# Patient Record
Sex: MALE | Race: OTHER | Employment: FULL TIME | URBAN - METROPOLITAN AREA
[De-identification: names, ages, dates, MRNs, and addresses within clinical notes are randomized per-mention and may not be internally consistent; named-entity substitution may affect disease eponyms.]

---

## 2017-04-20 ENCOUNTER — OFFICE VISIT (OUTPATIENT)
Dept: FAMILY MEDICINE CLINIC | Facility: CLINIC | Age: 27
End: 2017-04-20

## 2017-04-20 ENCOUNTER — LAB ENCOUNTER (OUTPATIENT)
Dept: LAB | Age: 27
End: 2017-04-20
Attending: EMERGENCY MEDICINE
Payer: COMMERCIAL

## 2017-04-20 VITALS
HEIGHT: 67.5 IN | RESPIRATION RATE: 18 BRPM | SYSTOLIC BLOOD PRESSURE: 126 MMHG | DIASTOLIC BLOOD PRESSURE: 84 MMHG | BODY MASS INDEX: 48.86 KG/M2 | HEART RATE: 95 BPM | TEMPERATURE: 98 F | OXYGEN SATURATION: 96 % | WEIGHT: 315 LBS

## 2017-04-20 DIAGNOSIS — Z13.228 SCREENING FOR ENDOCRINE, NUTRITIONAL, METABOLIC AND IMMUNITY DISORDER: ICD-10-CM

## 2017-04-20 DIAGNOSIS — R35.89 POLYURIA: ICD-10-CM

## 2017-04-20 DIAGNOSIS — Z13.29 SCREENING FOR ENDOCRINE, NUTRITIONAL, METABOLIC AND IMMUNITY DISORDER: ICD-10-CM

## 2017-04-20 DIAGNOSIS — E66.01 MORBID OBESITY WITH BMI OF 40.0-44.9, ADULT (HCC): ICD-10-CM

## 2017-04-20 DIAGNOSIS — Z13.0 SCREENING FOR ENDOCRINE, NUTRITIONAL, METABOLIC AND IMMUNITY DISORDER: ICD-10-CM

## 2017-04-20 DIAGNOSIS — Z13.21 SCREENING FOR ENDOCRINE, NUTRITIONAL, METABOLIC AND IMMUNITY DISORDER: ICD-10-CM

## 2017-04-20 DIAGNOSIS — N30.01 ACUTE CYSTITIS WITH HEMATURIA: Primary | ICD-10-CM

## 2017-04-20 PROCEDURE — 99204 OFFICE O/P NEW MOD 45 MIN: CPT | Performed by: EMERGENCY MEDICINE

## 2017-04-20 PROCEDURE — 81003 URINALYSIS AUTO W/O SCOPE: CPT | Performed by: EMERGENCY MEDICINE

## 2017-04-20 PROCEDURE — 83036 HEMOGLOBIN GLYCOSYLATED A1C: CPT | Performed by: EMERGENCY MEDICINE

## 2017-04-20 PROCEDURE — 36415 COLL VENOUS BLD VENIPUNCTURE: CPT | Performed by: EMERGENCY MEDICINE

## 2017-04-20 PROCEDURE — 82306 VITAMIN D 25 HYDROXY: CPT | Performed by: EMERGENCY MEDICINE

## 2017-04-20 PROCEDURE — 80050 GENERAL HEALTH PANEL: CPT | Performed by: EMERGENCY MEDICINE

## 2017-04-20 PROCEDURE — 80061 LIPID PANEL: CPT | Performed by: EMERGENCY MEDICINE

## 2017-04-20 RX ORDER — CIPROFLOXACIN 500 MG/1
500 TABLET, FILM COATED ORAL 2 TIMES DAILY
Qty: 14 TABLET | Refills: 0 | Status: SHIPPED | OUTPATIENT
Start: 2017-04-20 | End: 2017-04-27

## 2017-04-20 RX ORDER — FLUOXETINE HYDROCHLORIDE 40 MG/1
1 CAPSULE ORAL NIGHTLY
Refills: 1 | COMMUNITY
Start: 2017-04-03

## 2017-04-20 RX ORDER — BUPROPION HYDROCHLORIDE 100 MG/1
1 TABLET, EXTENDED RELEASE ORAL DAILY
Refills: 1 | COMMUNITY
Start: 2017-04-03 | End: 2017-06-05 | Stop reason: ALTCHOICE

## 2017-04-20 NOTE — PROGRESS NOTES
URINARY SX  Frequency and urgency of urination for the 5-6 days. + dysuria that is getting better  Noticed blood in the area  No fever no chills  No penile discharge.   No new partners  No back pain  + polyuria and polydypsia    WEIGHT GAIN  heaviest doyle areas  HEENT: atraumatic, normocephalic, ears, nose and throat are clear  EYES: sclera non icteric bilateral  NECK: supple, no adenopathy, no thyromegaly  LUNGS: clear to auscultation, no RRW  CARDIO: RRR without murmur  EXTREMITIES: no cyanosis, clubbing discussion of blood tests

## 2017-04-20 NOTE — PATIENT INSTRUCTIONS
Thank you for choosing [facility]  To Do:  FOR DIANE ELLER  1. Follow up in 1 week for recheck and discussion of labs  2. Follow up with weight loss clinic  3. Start losing weight by taking extra calories and sugras away.  Overall less caloric with you:  · Take a resistance-training or aerobics class  · Join a team sport  · Take a dance class  · Walk the dog  · Ride a bike  If you have health problems, be sure to ask your healthcare provider before you start an exercise program. Have a fitness p favorites. · If you find yourself eating when you’re not hungry, ask yourself why. Many of us eat when we’re bored, stressed, or just to be polite. Listen to your body. If you’re not hungry, get busy doing something else instead of eating.   · Eat slower, changes. When you can stick to this plan, keep making a few more small changes. Taking small steps will help you stay on the path to success. Track your progress  Write down your goals. Then, keep a daily record of your progress.  Write down what you eat a

## 2017-04-24 ENCOUNTER — TELEPHONE (OUTPATIENT)
Dept: FAMILY MEDICINE CLINIC | Facility: CLINIC | Age: 27
End: 2017-04-24

## 2017-04-24 NOTE — TELEPHONE ENCOUNTER
----- Message from Trish Saunders MD sent at 4/21/2017 12:46 PM CDT -----  Low vitamin D, needs vitamin D 50,000 units q weekly #4 with 2 refills then, OTC VIT D 5348-9021 units q daily maintenance   Repeat Vitamin D in 6 months.      Will discuss labs o

## 2017-04-27 ENCOUNTER — OFFICE VISIT (OUTPATIENT)
Dept: FAMILY MEDICINE CLINIC | Facility: CLINIC | Age: 27
End: 2017-04-27

## 2017-04-27 VITALS
HEIGHT: 67.5 IN | OXYGEN SATURATION: 98 % | TEMPERATURE: 99 F | WEIGHT: 315 LBS | RESPIRATION RATE: 20 BRPM | HEART RATE: 100 BPM | BODY MASS INDEX: 48.86 KG/M2 | SYSTOLIC BLOOD PRESSURE: 132 MMHG | DIASTOLIC BLOOD PRESSURE: 90 MMHG

## 2017-04-27 DIAGNOSIS — E66.01 MORBID OBESITY WITH BMI OF 40.0-44.9, ADULT (HCC): ICD-10-CM

## 2017-04-27 DIAGNOSIS — R73.03 PREDIABETES: Primary | ICD-10-CM

## 2017-04-27 DIAGNOSIS — E55.9 VITAMIN D DEFICIENCY: ICD-10-CM

## 2017-04-27 DIAGNOSIS — E78.00 HYPERCHOLESTEROLEMIA: ICD-10-CM

## 2017-04-27 DIAGNOSIS — J45.20 MILD INTERMITTENT ASTHMA WITHOUT COMPLICATION: ICD-10-CM

## 2017-04-27 DIAGNOSIS — N30.00 ACUTE CYSTITIS WITHOUT HEMATURIA: ICD-10-CM

## 2017-04-27 DIAGNOSIS — F39 MOOD DISORDER (HCC): ICD-10-CM

## 2017-04-27 PROCEDURE — 99214 OFFICE O/P EST MOD 30 MIN: CPT | Performed by: EMERGENCY MEDICINE

## 2017-04-27 RX ORDER — ERGOCALCIFEROL 1.25 MG/1
50000 CAPSULE ORAL WEEKLY
Qty: 12 CAPSULE | Refills: 0 | Status: SHIPPED | OUTPATIENT
Start: 2017-04-27 | End: 2017-05-27

## 2017-04-27 RX ORDER — ALBUTEROL SULFATE 90 UG/1
2 AEROSOL, METERED RESPIRATORY (INHALATION) EVERY 6 HOURS PRN
Qty: 1 INHALER | Refills: 1 | Status: SHIPPED | OUTPATIENT
Start: 2017-04-27

## 2017-04-27 NOTE — PROGRESS NOTES
Chief Complaint:   Patient presents with: Follow - Up: f/u labs    HPI:   This is a 32year old male     FOLLOW UP UTI  On Ciprofloxacin. Reports resolution of Symptoms is much improved. Denies any urinary symptoms no fever no flank pain.   Is on the last Take 1 capsule by mouth nightly. Disp:  Rfl: 1   Ciprofloxacin HCl 500 MG Oral Tab Take 1 tablet (500 mg total) by mouth 2 (two) times daily. Disp: 14 tablet Rfl: 0     No current facility-administered medications on file prior to visit.    Counseling given [id number] Numeric   Multistix Expiration Date 3/31/2018 Date   -URINE CULTURE, ROUTINE   Collection Time: 04/20/17  9:47 AM   Result Value Ref Range   Urine Culture >100,000 CFU/ML Escherichia coli (A)    *Culture results found, see result in Chart Review Lymphocyte Absolute 2.19 0.90-4.00 x10(3) uL   Monocyte Absolute 0.63 (H) 0.10-0.60 x10(3) uL   Eosinophil Absolute 0.18 0.00-0.30 x10(3) uL   Basophil Absolute 0.03 0.00-0.10 x10(3) uL   Immature Granulocyte Absolute 0.05 0.00-1.00 x10(3) uL   Neutrophi

## 2017-04-27 NOTE — PATIENT INSTRUCTIONS
Thank you for choosing [facility]  To Do:  FOR DIANE ELLER  1. Take Vit D high dose once a week for 12 weeks, then take OTC Vit D 7563-1684 IU dailyl. Repeat Vit D levels in 6 months  2. Follow up with weight loss clinic  3.  Start Metformin can succeed, chances are you won’t. Believe that you can stick to your plan and meet your goals:  · If you don’t meet a goal, don’t use it as an excuse to give up on your whole plan. Adjust your goal and try again.   · Try to understand your own attitude ab To do this, eat 250 calories less each day. Add activity to burn the other 250 calories. Walking 2.5 miles burns about 250 calories. Other more intense activities can burn more calories in the time you spend doing them, such as swimming and running.  It is problems, such as high blood pressure, heart disease, diabetes, sleep apnea, and arthritis. You may also feel more energy. Set your long-term goal  Your goal doesn't even have to be a specific weight.  You may decide on a fitness goal (such as being able t losing weight can help you separate what works from what doesn’t. Don’t be taken in by expensive weight-loss fads like pills, herbs, and special foods that promise unbelievable results. There’s no magic way to lose weight.  If you have questions about weigh calories and you’ll gain weight. It’s OK to treat yourself to a fat-free cookie or two. Just don’t eat the whole box!  A dietitian will help you figure this out, and will likely recommend that you eat three meals a day, with protein with each meal.   Date L yourself why. Many of us eat when we’re bored, stressed, or just to be polite. Listen to your body. If you’re not hungry, get busy doing something else instead of eating.   · Eat slower, shooting for 20 to 30 minutes for each meal. It takes 20 minutes for y provider whether you should do so the night of your study. Most people undergoing a sleep study should take all of their medicines as they typically would at home. But, it is best to check with your healthcare provider to confirm.   · Bring your toothbrush, in the nose or throat  Home care  Lifestyle changes that can help treat snoring and sleep apnea include the following:  · If you are overweight, lose weight. Talk to your healthcare provider about a weight-loss plan for you.   · Avoid alcohol for 3 to 4 piotr

## 2017-06-05 ENCOUNTER — OFFICE VISIT (OUTPATIENT)
Dept: FAMILY MEDICINE CLINIC | Facility: CLINIC | Age: 27
End: 2017-06-05

## 2017-06-05 VITALS
BODY MASS INDEX: 60 KG/M2 | DIASTOLIC BLOOD PRESSURE: 88 MMHG | RESPIRATION RATE: 18 BRPM | OXYGEN SATURATION: 98 % | HEART RATE: 94 BPM | SYSTOLIC BLOOD PRESSURE: 138 MMHG | WEIGHT: 315 LBS | TEMPERATURE: 100 F

## 2017-06-05 DIAGNOSIS — R68.82 DECREASED LIBIDO: ICD-10-CM

## 2017-06-05 DIAGNOSIS — R73.03 PREDIABETES: ICD-10-CM

## 2017-06-05 DIAGNOSIS — F90.9 ATTENTION DEFICIT HYPERACTIVITY DISORDER (ADHD), UNSPECIFIED ADHD TYPE: ICD-10-CM

## 2017-06-05 DIAGNOSIS — G47.33 OSA (OBSTRUCTIVE SLEEP APNEA): ICD-10-CM

## 2017-06-05 DIAGNOSIS — E66.01 MORBID OBESITY WITH BMI OF 40.0-44.9, ADULT (HCC): Primary | ICD-10-CM

## 2017-06-05 PROBLEM — E55.9 VITAMIN D DEFICIENCY: Status: ACTIVE | Noted: 2017-06-05

## 2017-06-05 PROCEDURE — 99214 OFFICE O/P EST MOD 30 MIN: CPT | Performed by: EMERGENCY MEDICINE

## 2017-06-05 RX ORDER — METFORMIN HYDROCHLORIDE 1000 MG/1
1000 TABLET, FILM COATED, EXTENDED RELEASE ORAL
Qty: 90 TABLET | Refills: 0 | Status: SHIPPED | OUTPATIENT
Start: 2017-06-05 | End: 2017-06-19

## 2017-06-05 NOTE — PROGRESS NOTES
Chief Complaint:   Patient presents with: Follow - Up: metformin. HPI:   This is a 32year old male       WEIGHT LOSS  Has been unable to follow up with weight loss clinic.   Diarrhea and stomach cramping with Metformin 500 mg BID.   states that clothes by mouth nightly. Disp:  Rfl: 1     No current facility-administered medications on file prior to visit.    Counseling given: Not Answered      REVIEW OF SYSTEMS:   Review of systems significant for decreased libido    The rest of the ROS is negative except Vyvanse will be titrated upward with psychiatry and anticipate this to aid in weight loss as well. Decreased libido  -     Testosterone, Total Free, Male [E];  Future    LISSETT (obstructive sleep apnea)   Continue follow-up with pulmonology for establishmen

## 2017-06-05 NOTE — PATIENT INSTRUCTIONS
Thank you for choosing [facility]  To Do:  FOR DIANE ELLER  1. Continue follow up with pulmonologist for CPAP  2. COntinue with MEtformin extended release  3. shima for dietician follow up for education  4. Follow up in 2-3 months  5.  Have [place] [address]. All rights reserved. This information is not intended as a substitute for professional medical care. Always follow your healthcare professional's instructions.         Eating the Right Number of Calories (9462–6448 Guidelines)  Calories are a micky healthcare professional's instructions. Losing Weight for Heart Health  Excess weight is a major risk factor for heart disease.  Losing weight has many benefits including lowering your blood pressure, improving your cholesterol level, and decreasing more time and speed to your walk. Build up as you feel able. · Aim for 3 to 4 sessions of aerobic exercise a week. Each session should last about 40 minutes and include moderate to vigorous physical activity.   · The most important part of the activity is apnea  · Liver disease  · Certain lung diseases  · Certain cancers  You may begin your treatment with your primary healthcare provider. But if you need more help, you may want to see a bariatric healthcare provider.  He or she may have new ideas or methods levels  · Thyroid-stimulating hormone levels  · Liver blood tests  · Kidney function blood tests  · Vitamin D levels  · Electrocardiogram, to look at your heart rhythm  · Exercise testing, to see how well your heart works during exercise  · Resting metabol

## 2017-06-19 ENCOUNTER — TELEPHONE (OUTPATIENT)
Dept: FAMILY MEDICINE CLINIC | Facility: CLINIC | Age: 27
End: 2017-06-19

## 2017-06-19 NOTE — TELEPHONE ENCOUNTER
Metformin ER (mod) 1000mg requires a PA. However, Metformin ER 500mg is typically covered since it is not the (mod) form. Okay for 2 tablets daily? Please advise. Thank you!

## 2017-06-20 RX ORDER — METFORMIN HYDROCHLORIDE 500 MG/1
1000 TABLET, EXTENDED RELEASE ORAL
Qty: 60 TABLET | Refills: 0 | Status: SHIPPED | OUTPATIENT
Start: 2017-06-20

## 2017-06-20 NOTE — TELEPHONE ENCOUNTER
Recent may take metformin 500 mg 1 tablet p.o. twice daily with meals. Since shorter acting better coverage if he takes morning and night. Please update patient. Faxed in new Rx #60.

## 2017-06-20 NOTE — TELEPHONE ENCOUNTER
Patient was changed over to ER because of GI issues. Please advise if okay for Metformin ER 500mg 2 tablets daily? Thank you!

## 2017-09-04 NOTE — TELEPHONE ENCOUNTER
LF: 06/20/2017   LOV: 06/05/2017    Pending Prescriptions Disp Refills    METFORMIN HCL  MG Oral Tablet 24 Hr [Pharmacy Med Name: METFORMIN ER 500MG 24HR TABS] 60 tablet 0     Sig: TAKE 2 TABLETS BY MOUTH DAILY WITH BREAKFAST         Pt Microalbumin procedure in past 12 months or taking ACE/ARB   Please approve or deny Rx.    Thank you

## 2017-09-05 RX ORDER — METFORMIN HYDROCHLORIDE 500 MG/1
TABLET, EXTENDED RELEASE ORAL
Qty: 60 TABLET | Refills: 0 | OUTPATIENT
Start: 2017-09-05

## 2019-02-14 ENCOUNTER — TELEPHONE (OUTPATIENT)
Dept: BEHAVIORAL HEALTH | Age: 29
End: 2019-02-14

## 2019-02-18 ENCOUNTER — BEHAVIORAL HEALTH (OUTPATIENT)
Dept: BEHAVIORAL HEALTH | Age: 29
End: 2019-02-18

## 2019-02-18 ENCOUNTER — APPOINTMENT (OUTPATIENT)
Dept: BEHAVIORAL HEALTH | Age: 29
End: 2019-02-18

## 2019-02-18 DIAGNOSIS — F43.22 ADJUSTMENT DISORDER WITH ANXIOUS MOOD: Primary | ICD-10-CM

## 2019-02-18 DIAGNOSIS — F33.1 MODERATE RECURRENT MAJOR DEPRESSION (CMD): ICD-10-CM

## 2019-02-18 PROCEDURE — 90791 PSYCH DIAGNOSTIC EVALUATION: CPT | Performed by: PSYCHOLOGIST

## 2019-02-21 ENCOUNTER — BEHAVIORAL HEALTH (OUTPATIENT)
Dept: BEHAVIORAL HEALTH | Age: 29
End: 2019-02-21

## 2019-02-21 VITALS
DIASTOLIC BLOOD PRESSURE: 84 MMHG | BODY MASS INDEX: 47.74 KG/M2 | SYSTOLIC BLOOD PRESSURE: 136 MMHG | HEIGHT: 68 IN | HEART RATE: 76 BPM | WEIGHT: 315 LBS

## 2019-02-21 DIAGNOSIS — F43.10 PTSD (POST-TRAUMATIC STRESS DISORDER): ICD-10-CM

## 2019-02-21 DIAGNOSIS — F41.1 GENERALIZED ANXIETY DISORDER: ICD-10-CM

## 2019-02-21 DIAGNOSIS — F31.31 BIPOLAR AFFECTIVE DISORDER, CURRENTLY DEPRESSED, MILD (CMD): Primary | ICD-10-CM

## 2019-02-21 DIAGNOSIS — F63.81 INTERMITTENT EXPLOSIVE DISORDER: ICD-10-CM

## 2019-02-21 PROBLEM — F90.2 ADHD (ATTENTION DEFICIT HYPERACTIVITY DISORDER), COMBINED TYPE: Status: ACTIVE | Noted: 2019-02-21

## 2019-02-21 PROBLEM — F41.9 ANXIETY DISORDER: Status: ACTIVE | Noted: 2019-02-21

## 2019-02-21 PROCEDURE — 90792 PSYCH DIAG EVAL W/MED SRVCS: CPT | Performed by: NURSE PRACTITIONER

## 2019-02-21 RX ORDER — GABAPENTIN 300 MG/1
300 CAPSULE ORAL 4 TIMES DAILY PRN
Qty: 120 CAPSULE | Refills: 0 | Status: SHIPPED | OUTPATIENT
Start: 2019-02-21 | End: 2019-03-20 | Stop reason: SDUPTHER

## 2019-02-21 RX ORDER — EAR PLUGS
EACH OTIC (EAR)
COMMUNITY

## 2019-02-21 RX ORDER — GABAPENTIN 300 MG/1
300 CAPSULE ORAL 4 TIMES DAILY PRN
COMMUNITY
End: 2019-02-21 | Stop reason: SDUPTHER

## 2019-02-21 RX ORDER — LAMOTRIGINE 100 MG/1
100 TABLET ORAL DAILY
Qty: 30 TABLET | Refills: 0 | Status: SHIPPED | OUTPATIENT
Start: 2019-02-21 | End: 2019-02-27 | Stop reason: SDUPTHER

## 2019-02-21 RX ORDER — LAMOTRIGINE 25 MG/1
25 TABLET ORAL 2 TIMES DAILY
COMMUNITY
End: 2019-02-21 | Stop reason: SDUPTHER

## 2019-02-21 RX ORDER — MONTELUKAST SODIUM 10 MG/1
10 TABLET ORAL NIGHTLY
COMMUNITY

## 2019-02-21 RX ORDER — ALBUTEROL SULFATE 90 UG/1
2 AEROSOL, METERED RESPIRATORY (INHALATION) EVERY 4 HOURS PRN
COMMUNITY

## 2019-02-21 RX ORDER — VENLAFAXINE HYDROCHLORIDE 37.5 MG/1
37.5 CAPSULE, EXTENDED RELEASE ORAL DAILY
COMMUNITY
End: 2019-02-21 | Stop reason: SDUPTHER

## 2019-02-21 RX ORDER — VENLAFAXINE HYDROCHLORIDE 37.5 MG/1
37.5 CAPSULE, EXTENDED RELEASE ORAL DAILY
Qty: 30 CAPSULE | Refills: 0 | Status: SHIPPED | OUTPATIENT
Start: 2019-02-21 | End: 2019-03-20 | Stop reason: SDUPTHER

## 2019-02-21 SDOH — HEALTH STABILITY: MENTAL HEALTH: HOW OFTEN DO YOU HAVE A DRINK CONTAINING ALCOHOL?: NEVER

## 2019-02-21 ASSESSMENT — ANXIETY QUESTIONNAIRES
GAD7 TOTAL SCORE: 2
6. BECOMING EASILY ANNOYED OR IRRITABLE: NOT AT ALL
5. BEING SO RESTLESS THAT IT IS HARD TO SIT STILL: 1
2. NOT BEING ABLE TO STOP OR CONTROL WORRYING: SEVERAL DAYS
6. BECOMING EASILY ANNOYED OR IRRITABLE: 0
1. FEELING NERVOUS, ANXIOUS, OR ON EDGE: NOT AT ALL
4. TROUBLE RELAXING: 0
3. WORRYING TOO MUCH ABOUT DIFFERENT THINGS: 0
7. FEELING AFRAID AS IF SOMETHING AWFUL MIGHT HAPPEN: 0
7. FEELING AFRAID AS IF SOMETHING AWFUL MIGHT HAPPEN: NOT AT ALL
4. TROUBLE RELAXING: NOT AT ALL
1. FEELING NERVOUS, ANXIOUS, OR ON EDGE: 0
3. WORRYING TOO MUCH ABOUT DIFFERENT THINGS: NOT AT ALL
5. BEING SO RESTLESS THAT IT IS HARD TO SIT STILL: SEVERAL DAYS
2. NOT BEING ABLE TO STOP OR CONTROL WORRYING: 1

## 2019-02-21 ASSESSMENT — ENCOUNTER SYMPTOMS
NERVOUS/ANXIOUS: 0
COUGH: 1
AGITATION: 1
EYES NEGATIVE: 1
DIARRHEA: 1
APPETITE CHANGE: 1
SORE THROAT: 1
SLEEP DISTURBANCE: 0
DIZZINESS: 1

## 2019-02-21 ASSESSMENT — PATIENT HEALTH QUESTIONNAIRE - PHQ9
SUM OF ALL RESPONSES TO PHQ9 QUESTIONS 1 AND 2: 0
2. FEELING DOWN, DEPRESSED OR HOPELESS: NOT AT ALL
SUM OF ALL RESPONSES TO PHQ9 QUESTIONS 1 TO 9: 2
SUM OF ALL RESPONSES TO PHQ QUESTIONS 1-9: 2
SUM OF ALL RESPONSES TO PHQ9 QUESTIONS 1 AND 2: 0
4. FEELING TIRED OR HAVING LITTLE ENERGY: NOT AT ALL
CLINICAL INTERPRETATION OF PHQ9 SCORE: MINIMAL DEPRESSION
7. TROUBLE CONCENTRATING ON THINGS, SUCH AS READING THE NEWSPAPER OR WATCHING TELEVISION: NOT AT ALL
1. LITTLE INTEREST OR PLEASURE IN DOING THINGS: NOT AT ALL
9. THOUGHTS THAT YOU WOULD BE BETTER OFF DEAD, OR OF HURTING YOURSELF: NOT AT ALL
8. MOVING OR SPEAKING SO SLOWLY THAT OTHER PEOPLE COULD HAVE NOTICED. OR THE OPPOSITE, BEING SO FIGETY OR RESTLESS THAT YOU HAVE BEEN MOVING AROUND A LOT MORE THAN USUAL: NOT AT ALL
3. TROUBLE FALLING OR STAYING ASLEEP OR SLEEPING TOO MUCH: SEVERAL DAYS
6. FEELING BAD ABOUT YOURSELF - OR THAT YOU ARE A FAILURE OR HAVE LET YOURSELF OR YOUR FAMILY DOWN: NOT AT ALL
5. POOR APPETITE, WEIGHT LOSS, OR OVEREATING: SEVERAL DAYS

## 2019-02-27 ENCOUNTER — BEHAVIORAL HEALTH (OUTPATIENT)
Dept: BEHAVIORAL HEALTH | Age: 29
End: 2019-02-27

## 2019-02-27 ENCOUNTER — SCAN ENCOUNTER (OUTPATIENT)
Dept: BEHAVIORAL HEALTH | Age: 29
End: 2019-02-27

## 2019-02-27 VITALS
DIASTOLIC BLOOD PRESSURE: 76 MMHG | SYSTOLIC BLOOD PRESSURE: 132 MMHG | WEIGHT: 315 LBS | HEART RATE: 70 BPM | HEIGHT: 68 IN | BODY MASS INDEX: 47.74 KG/M2

## 2019-02-27 DIAGNOSIS — F41.1 GENERALIZED ANXIETY DISORDER: ICD-10-CM

## 2019-02-27 DIAGNOSIS — F63.81 INTERMITTENT EXPLOSIVE DISORDER: ICD-10-CM

## 2019-02-27 DIAGNOSIS — F31.31 BIPOLAR AFFECTIVE DISORDER, CURRENTLY DEPRESSED, MILD (CMD): Primary | ICD-10-CM

## 2019-02-27 DIAGNOSIS — F43.10 PTSD (POST-TRAUMATIC STRESS DISORDER): ICD-10-CM

## 2019-02-27 PROCEDURE — 99213 OFFICE O/P EST LOW 20 MIN: CPT | Performed by: NURSE PRACTITIONER

## 2019-02-27 RX ORDER — LAMOTRIGINE 25 MG/1
TABLET ORAL
Qty: 90 TABLET | Refills: 0 | Status: SHIPPED | OUTPATIENT
Start: 2019-02-27 | End: 2019-03-20 | Stop reason: SDUPTHER

## 2019-02-27 ASSESSMENT — ENCOUNTER SYMPTOMS
RESPIRATORY NEGATIVE: 1
EYES NEGATIVE: 1
HALLUCINATIONS: 0
AGITATION: 1
SLEEP DISTURBANCE: 0
GASTROINTESTINAL NEGATIVE: 1
DIZZINESS: 1
NERVOUS/ANXIOUS: 1
CONSTITUTIONAL NEGATIVE: 1

## 2019-03-01 ENCOUNTER — BEHAVIORAL HEALTH (OUTPATIENT)
Dept: BEHAVIORAL HEALTH | Age: 29
End: 2019-03-01

## 2019-03-01 DIAGNOSIS — F43.22 ADJUSTMENT DISORDER WITH ANXIOUS MOOD: ICD-10-CM

## 2019-03-01 DIAGNOSIS — F33.1 MODERATE RECURRENT MAJOR DEPRESSION (CMD): Primary | ICD-10-CM

## 2019-03-01 PROCEDURE — 90834 PSYTX W PT 45 MINUTES: CPT | Performed by: PSYCHOLOGIST

## 2019-03-06 ENCOUNTER — BEHAVIORAL HEALTH (OUTPATIENT)
Dept: BEHAVIORAL HEALTH | Age: 29
End: 2019-03-06

## 2019-03-06 DIAGNOSIS — F43.22 ADJUSTMENT DISORDER WITH ANXIOUS MOOD: Primary | ICD-10-CM

## 2019-03-06 DIAGNOSIS — F33.1 MODERATE RECURRENT MAJOR DEPRESSION (CMD): ICD-10-CM

## 2019-03-06 DIAGNOSIS — F33.0 MAJOR DEPRESSIVE DISORDER, RECURRENT EPISODE, MILD (CMD): ICD-10-CM

## 2019-03-06 PROCEDURE — 90834 PSYTX W PT 45 MINUTES: CPT | Performed by: PSYCHOLOGIST

## 2019-03-07 ENCOUNTER — TELEPHONE (OUTPATIENT)
Dept: BEHAVIORAL HEALTH | Age: 29
End: 2019-03-07

## 2019-03-08 ENCOUNTER — TELEPHONE (OUTPATIENT)
Dept: BEHAVIORAL HEALTH | Age: 29
End: 2019-03-08

## 2019-03-20 ENCOUNTER — BEHAVIORAL HEALTH (OUTPATIENT)
Dept: BEHAVIORAL HEALTH | Age: 29
End: 2019-03-20

## 2019-03-20 VITALS
HEART RATE: 68 BPM | BODY MASS INDEX: 63.64 KG/M2 | DIASTOLIC BLOOD PRESSURE: 80 MMHG | WEIGHT: 315 LBS | SYSTOLIC BLOOD PRESSURE: 142 MMHG

## 2019-03-20 DIAGNOSIS — F43.10 PTSD (POST-TRAUMATIC STRESS DISORDER): ICD-10-CM

## 2019-03-20 DIAGNOSIS — F63.81 INTERMITTENT EXPLOSIVE DISORDER: ICD-10-CM

## 2019-03-20 DIAGNOSIS — F43.22 ADJUSTMENT DISORDER WITH ANXIOUS MOOD: ICD-10-CM

## 2019-03-20 DIAGNOSIS — F41.1 GENERALIZED ANXIETY DISORDER: ICD-10-CM

## 2019-03-20 DIAGNOSIS — F31.31 BIPOLAR AFFECTIVE DISORDER, CURRENTLY DEPRESSED, MILD (CMD): Primary | ICD-10-CM

## 2019-03-20 PROCEDURE — 99214 OFFICE O/P EST MOD 30 MIN: CPT | Performed by: NURSE PRACTITIONER

## 2019-03-20 PROCEDURE — 90834 PSYTX W PT 45 MINUTES: CPT | Performed by: PSYCHOLOGIST

## 2019-03-20 RX ORDER — GABAPENTIN 300 MG/1
300 CAPSULE ORAL 4 TIMES DAILY PRN
Qty: 120 CAPSULE | Refills: 0 | Status: SHIPPED | OUTPATIENT
Start: 2019-03-20 | End: 2019-04-19

## 2019-03-20 RX ORDER — LAMOTRIGINE 25 MG/1
TABLET ORAL
Qty: 90 TABLET | Refills: 1 | Status: SHIPPED | OUTPATIENT
Start: 2019-03-20

## 2019-03-20 RX ORDER — VENLAFAXINE HYDROCHLORIDE 75 MG/1
75 CAPSULE, EXTENDED RELEASE ORAL DAILY
Qty: 30 CAPSULE | Refills: 1 | Status: SHIPPED | OUTPATIENT
Start: 2019-03-20 | End: 2019-04-19

## 2019-03-20 ASSESSMENT — ENCOUNTER SYMPTOMS
NERVOUS/ANXIOUS: 1
CONSTITUTIONAL NEGATIVE: 1
NEUROLOGICAL NEGATIVE: 1
SLEEP DISTURBANCE: 0
RESPIRATORY NEGATIVE: 1
AGITATION: 1
GASTROINTESTINAL NEGATIVE: 1
EYES NEGATIVE: 1

## 2019-05-10 ENCOUNTER — BEHAVIORAL HEALTH (OUTPATIENT)
Dept: BEHAVIORAL HEALTH | Age: 29
End: 2019-05-10

## 2019-05-10 DIAGNOSIS — F43.23 ADJUSTMENT DISORDER WITH MIXED ANXIETY AND DEPRESSED MOOD: ICD-10-CM

## 2019-05-10 DIAGNOSIS — F41.1 GENERALIZED ANXIETY DISORDER: ICD-10-CM

## 2019-05-10 DIAGNOSIS — F31.31 BIPOLAR AFFECTIVE DISORDER, CURRENTLY DEPRESSED, MILD (CMD): Primary | ICD-10-CM

## 2019-05-10 PROCEDURE — 90834 PSYTX W PT 45 MINUTES: CPT | Performed by: PSYCHOLOGIST

## 2019-05-15 ENCOUNTER — BEHAVIORAL HEALTH (OUTPATIENT)
Dept: BEHAVIORAL HEALTH | Age: 29
End: 2019-05-15

## 2019-05-15 DIAGNOSIS — F31.31 BIPOLAR AFFECTIVE DISORDER, CURRENTLY DEPRESSED, MILD (CMD): Primary | ICD-10-CM

## 2019-05-15 DIAGNOSIS — F43.23 ADJUSTMENT DISORDER WITH MIXED ANXIETY AND DEPRESSED MOOD: ICD-10-CM

## 2019-05-15 DIAGNOSIS — F41.1 GENERALIZED ANXIETY DISORDER: ICD-10-CM

## 2019-05-15 PROCEDURE — 90834 PSYTX W PT 45 MINUTES: CPT | Performed by: PSYCHOLOGIST

## 2019-05-16 ENCOUNTER — APPOINTMENT (OUTPATIENT)
Dept: BEHAVIORAL HEALTH | Age: 29
End: 2019-05-16

## 2019-05-21 ENCOUNTER — BEHAVIORAL HEALTH (OUTPATIENT)
Dept: BEHAVIORAL HEALTH | Age: 29
End: 2019-05-21

## 2019-05-21 DIAGNOSIS — F31.31 BIPOLAR AFFECTIVE DISORDER, CURRENTLY DEPRESSED, MILD (CMD): Primary | ICD-10-CM

## 2019-05-21 DIAGNOSIS — F41.1 GENERALIZED ANXIETY DISORDER: ICD-10-CM

## 2019-05-21 DIAGNOSIS — F43.23 ADJUSTMENT DISORDER WITH MIXED ANXIETY AND DEPRESSED MOOD: ICD-10-CM

## 2019-05-21 PROCEDURE — 90834 PSYTX W PT 45 MINUTES: CPT | Performed by: PSYCHOLOGIST

## 2019-05-31 ENCOUNTER — BEHAVIORAL HEALTH (OUTPATIENT)
Dept: BEHAVIORAL HEALTH | Age: 29
End: 2019-05-31

## 2019-05-31 DIAGNOSIS — F41.1 GENERALIZED ANXIETY DISORDER: ICD-10-CM

## 2019-05-31 DIAGNOSIS — F43.23 ADJUSTMENT DISORDER WITH MIXED ANXIETY AND DEPRESSED MOOD: ICD-10-CM

## 2019-05-31 DIAGNOSIS — F31.31 BIPOLAR AFFECTIVE DISORDER, CURRENTLY DEPRESSED, MILD (CMD): Primary | ICD-10-CM

## 2019-05-31 PROCEDURE — 90834 PSYTX W PT 45 MINUTES: CPT | Performed by: PSYCHOLOGIST

## 2019-11-07 DIAGNOSIS — F43.10 PTSD (POST-TRAUMATIC STRESS DISORDER): ICD-10-CM

## 2019-11-07 DIAGNOSIS — F41.1 GENERALIZED ANXIETY DISORDER: ICD-10-CM

## 2019-11-07 DIAGNOSIS — F31.31 BIPOLAR AFFECTIVE DISORDER, CURRENTLY DEPRESSED, MILD (CMD): ICD-10-CM

## 2019-11-07 RX ORDER — VENLAFAXINE HYDROCHLORIDE 75 MG/1
CAPSULE, EXTENDED RELEASE ORAL
Qty: 30 CAPSULE | Refills: 1 | OUTPATIENT
Start: 2019-11-07

## (undated) NOTE — MR AVS SNAPSHOT
[address]  [address]  [address] [address]  [phone]               Thank you for choosing us for your health care visit with Lázaro Coto MD.  We are glad to serve you and happy to provide you with this summary of 3. arryiselge for dietician follow up for education  4. Follow up in 2-3 months  5. Have blood tests done for testosterone  6.  Do daily blood sugars in AM, record and bring to next visit      [facility]:  [phone]         professional's instructions. Eating the Right Number of Calories (4388–5583 Guidelines)  Calories are a measure of the energy you get from food. If you eat more calories than you use, you will gain weight.  If you eat fewer calories than you use, you Excess weight is a major risk factor for heart disease. Losing weight has many benefits including lowering your blood pressure, improving your cholesterol level, and decreasing your risk for diseases such as diabetes and heart disease.  It may help keep you · Aim for 3 to 4 sessions of aerobic exercise a week. Each session should last about 40 minutes and include moderate to vigorous physical activity. · The most important part of the activity is that you break a sweat.  This indicates your heart is working h · Certain cancers  You may begin your treatment with your primary healthcare provider. But if you need more help, you may want to see a bariatric healthcare provider. He or she may have new ideas or methods for weight loss that can help you.  Some bariatric · Liver blood tests  · Kidney function blood tests  · Vitamin D levels  · Electrocardiogram, to look at your heart rhythm  · Exercise testing, to see how well your heart works during exercise  · Resting metabolic rate, to look at how many calories you burn These medications were sent to [facility] [address] [place], [address] AT [address] , [phone], [address] [facility] [address]    Hours:  24-hours Phone:  [phone] - [facility]

## (undated) NOTE — LETTER
11/01/17        [address]      Dear Tran Yuen,    [address] records indicate that you have outstanding lab work and or testing that was ordered for you and has not yet been completed:          Vitamin D      Lipid

## (undated) NOTE — MR AVS SNAPSHOT
[address]  [address]  [address] [address] [phone]               Thank you for choosing us for your health care visit with Sebastian Nielsen MD.  We are glad to serve you and happy to provide you with this summary of your appointment immediately. However, if you are unsure about the requirements for authorization, please wait 5-7 days and then contact your physician's   office.  At that time, you will be provided with any authorization numbers or be assured that none ar more than once a week at the same time of day. Weighing yourself each day will probably only frustrate you.    Stay motivated  Here are suggestions to keep you motivated:   · Remind yourself of your goals.  Post them near the refrigerator or desk where you [address]. All rights reserved. This information is not intended as a substitute for professional medical care. Always follow your healthcare professional's instructions.         Losing Weight for Heart Health  Excess weight is a major risk factor for heart dis It’s also a great way to burn calories. In fact, your body may keep burning calories for hours after you stop a brisk activity:  · Begin by walking 10 minutes most days. · Add more time and speed to your walk. Build up as you feel able.   · Aim for 3 to 4 going to make these changes. When you can stick to this plan, keep making a few more small changes. Taking small steps will help you stay on the path to success. Track your progress  Write down your goals. Then, keep a daily record of your progress.  Write work. Hunger makes you more likely to overeat later on. It’s best to spread your meals throughout the day. Eat at least three meals a day. Fiction: “I can’t start exercising until I lose weight.”  Fact: The sooner you start exercising the better.  Exercise food labels can help you make healthy choices. Also, learn new eating habits to manage your weight. All the values on the label are based on one serving. The serving size is the average portion.  Remember to multiply the values on the label by the numbe substitute for professional medical care. Always follow your healthcare professional's instructions.         Monitoring Your Sleep: Sleep Lab Testing  Checking your sleep during a nighttime sleep study is often the only way to find out if you have condition also tell how severe the apnea is. The findings help your healthcare provider know which treatment or treatments may be the right ones for you. Date Last Reviewed: 8/9/2015  © 3648-2850 The [address] [place] [address] [place]. · If you have allergies or sinus problems that block your nose, ask your healthcare provider for help. Follow up  Follow up with your healthcare provider as advised. A diagnosis of sleep apnea is made with a sleep study.  Your healthcare provider can tell Where to Get Your Medications      These medications were sent to [facility] [address] [place], [address] , [phone], [address] [facility], [place] [address]    Hours:  24-hours walking, light jogging, cycling, swimming, etc.) for a goal of at least 150 minutes per week. Moderation of alcohol consumption Men: limit to <= 2 drinks* per day. Women and lighter weight persons: limit to <= 1 drink* per day.

## (undated) NOTE — MR AVS SNAPSHOT
[address]  [address]  [place] [address] [address] [phone]               Thank you for choosing us for your health care visit with Esequiel Hopper MD.  We are glad to serve you and happy to provide you with this summary of Physical           Medical Issues Discussed Today     Screening for endocrine, nutritional, metabolic and immunity disorder    -  Primary    UTI symptoms        Acute cystitis with hematuria          Instructions and Information about Your Health    Thank burns calories faster than fat. The more muscle you have, the more calories you burn. · Exercise gives you energy and curbs your appetite. · Exercise decreases stress and helps you sleep better. Make exercise fun  Exercise can be fun.  Choose an activity As you begin to eat more fiber, be sure to drink plenty of water to keep your digestive system working smoothly. Tips  Do's and don'ts include:   · Don’t skip meals. This often leads to overeating later on.  It’s best to spread your eating throughout the d reaching a BMI of less than 25 is not always reasonable (or possible).   Make an action plan  Habits don’t change overnight. Setting your goals too high can leave you feeling discouraged if you can’t reach them. Be realistic.  Choose one or two small change Take 1 tablet (500 mg total) by mouth 2 (two) times daily. Commonly known as:  CIPRO           FLUoxetine HCl 40 MG Caps   Take 1 capsule by mouth nightly.    Commonly known as:  PROZAC                Where to Get Your Medications      These medications w medical emergencies, dial 911. Educational Information     Healthy Diet and Regular Exercise  [employer] for making healthy food choices  -   Enjoy your food, but eat less. Fully enjoy your food when eating.    Don’t eat w